# Patient Record
(demographics unavailable — no encounter records)

---

## 2025-05-02 NOTE — REASON FOR VISIT
[Initial Visit] : an initial visit for [Artificial Hip Joint] : an artificial hip joint [Hip Pain] : hip pain [FreeTextEntry2] :  55 YR OLD RETURNING FOR SAME ISSUE OF RIGHT HIP PAIN. REPLACEMENT DONE 7/29/20.

## 2025-05-02 NOTE — HISTORY OF PRESENT ILLNESS
[de-identified] : First-time visit for this patient greater than 3 years.  He is here with complaint of right buttock pain groin pain and thigh pain.  He is status post proximately 5 years hip replacement surgery on the right.  He recalls no specific accident injury initiating traumatic event he is here because of a sense of pain and stiffness in the right hip.

## 2025-05-02 NOTE — DISCUSSION/SUMMARY
[de-identified] : Patient I discussed the possible underlying etiology of his right hip pain.  Patient has a clinical exam and history that is probably more consistent with right lumbar radiculopathy and any pathology of the right hip.  Despite that patient will be sent for radiographs today to evaluate his hips we will notify the patient of any significant findings if there is no significant findings on the radiographs I will probably be placing him on a Medrol Dosepak on the assumption of radicular issues.  If the patient has pain that persist beyond a Medrol Dosepak an MRI of the lumbar spine may be warranted at that point.  Once again we will delay use of any anti-inflammatory such as Medrol Dosepak until after we have a chance to review his hip radiographs from later today.  This consultation lasted 30 minutes exclusive teaching time and any separately billed procedures.